# Patient Record
Sex: FEMALE | Race: WHITE
[De-identification: names, ages, dates, MRNs, and addresses within clinical notes are randomized per-mention and may not be internally consistent; named-entity substitution may affect disease eponyms.]

---

## 2018-05-31 ENCOUNTER — HOSPITAL ENCOUNTER (EMERGENCY)
Dept: HOSPITAL 80 - FED | Age: 23
Discharge: HOME | End: 2018-05-31
Payer: COMMERCIAL

## 2018-05-31 VITALS — DIASTOLIC BLOOD PRESSURE: 69 MMHG | SYSTOLIC BLOOD PRESSURE: 118 MMHG

## 2018-05-31 DIAGNOSIS — M77.8: Primary | ICD-10-CM

## 2018-05-31 NOTE — EDPHY
General





- History


Smoking Status: Never smoked


Time Seen by Provider: 05/31/18 21:35


Narrative: 





CHIEF COMPLAINT: 


Right wrist pain





HISTORY OF PRESENT ILLNESS: 


Patient complains of right wrist and forearm pain.  This started approximately 

10 days ago.  This was after heavy adding of CHI packing.  The pain is located 

on the lateral aspect of the right forearm, mid to distally.  It was minimal 

over the 1st few days, over the past 2 days it has become moderate to severe.  

No numbness or tingling.  No weakness.  No blunt trauma or injury.  She has a 

secondary complaint of a set of abrasions to the left lateral ankle.  She does 

not know when her last tetanus was and would like to be updated.  No other 

associated complaints or modifying factors.





DOMINANT EXTREMITY:


Right-hand dominant





ESTABLISHED ORTHOPEDIST:


None yet.  She has called and scheduled appointment on Monday with Dr. Pelon Jc





REVIEW OF SYSTEMS:


Ten systems reviewed and are negative unless otherwise noted in the HPI








PAST MEDICAL HISTORY: 


Uncomplicated





PAST SURGICAL HISTORY:


No surgical history





SOCIAL HISTORY:


 [employer].  Originally from [place]





FAMILY HISTORY:


Noncontributory





EXAMINATION


General Appearance:  Alert, no distress


Cardiovascular:  Symmetric radial pulses 2+.  Brisk cap refill on the fingers 

of the right hand


Neurological:  A&O, sensory symmetric, interossei strength symmetric.   

strength symmetric


Skin:  Warm and dry, no rash.  No petechiae purpura.  No puncture, laceration 

or ecchymosis.  There is superficial abrasion to the left lateral ankle below 

the malleolus.  No cellulitis, abscess or crepitus


Extremities:  Moderate tenderness of the right lateral forearm from mid to 

distal portion.  No snuffbox tenderness.  No pain of the phalanges or 

metacarpals.  Pain is worse with ulnar deviation of the wrist.  Equivocal 

Finkelstein test.  No palpable masses the forearm.  Range of motion of the 

elbow was unremarkable.


Psychiatric:  Mood and affect normal








DIFFERENTIAL DIAGNOSES:


Including but not limited to sprain, strain, tendinitis, tenosynovitis








MDM:


9:40 p.m.


Acute right wrist tendinitis versus declare veins tenosynovitis.  There is no 

bony tenderness.  No crepitus.  She is fully neuro intact.  I do not feel that 

she warrants an x-ray, nor does she want 1 at this time.  I will place her in a 

thumb spica splint to protect the wrist and provide some support for her.  She 

is weight-bearing as tolerated.  I have also ordered a Tdap booster for her 

abrasion as she does not know when her last tetanus was.  We discussed ice, 

elevation and anti-inflammatories.  Discharged home stable condition with 

orthopedic follow-up that she has secured on Monday.  ED precautions discussed








SUPERVISION:


This patient was independently evaluated without direct involvement of or 

examination by the attending physician. 





ED Precautions: 


Worsening pain. Erythema, edema, cyanosis, pallor, paresthesia or anesthesia.


 (Geovany Gomez)





The patient was evaluated and managed by the physician assistant.  I have 

reviewed this chart and I agree with the findings and plan of care as documented

, as indicated by my signature.  I am the secondary supervising physician. (

Jossie Bush)





- Objective


Vital Signs: 





 Initial Vital Signs











Temperature (C)  36.8 C   05/31/18 20:58


 


Heart Rate  64   05/31/18 20:58


 


Respiratory Rate  16   05/31/18 20:58


 


Blood Pressure  118/69   05/31/18 20:58


 


O2 Sat (%)  98   05/31/18 20:58








 











O2 Delivery Mode               Room Air














Allergies/Adverse Reactions: 


 





No Known Allergies Allergy (Unverified 05/31/18 20:57)


 








Home Medications: 














 Medication  Instructions  Recorded


 


NK [No Known Home Meds]  05/31/18











Medications Given: 





 








Discontinued Medications





Ibuprofen (Motrin)  600 mg PO EDNOW ONE


   Stop: 05/31/18 21:41


   Last Admin: 05/31/18 21:45 Dose:  600 mg








Departure





- Departure


Disposition: Home, Routine, Self-Care


Clinical Impression: 


 Right wrist tendinitis





Condition: Good


Instructions:  De Quervain Disease (ED), Tendinitis (ED)


Additional Instructions: 


1. Ice and elevate often


2. Ibuprofen 600 mg every 6-8 hours as needed for pain


3. Keep the appointment that she made on Monday with Dr. Jc


Referrals: 


NONE *PRIMARY CARE P,. [Primary Care Provider] - As per Instructions


Pelon Jc MD [Medical Doctor] - As per Instructions

## 2018-12-10 ENCOUNTER — HOSPITAL ENCOUNTER (EMERGENCY)
Dept: HOSPITAL 80 - FED | Age: 23
Discharge: HOME | End: 2018-12-10
Payer: COMMERCIAL

## 2018-12-10 VITALS — SYSTOLIC BLOOD PRESSURE: 104 MMHG | DIASTOLIC BLOOD PRESSURE: 80 MMHG

## 2018-12-10 DIAGNOSIS — Z97.5: ICD-10-CM

## 2018-12-10 DIAGNOSIS — R10.13: Primary | ICD-10-CM

## 2018-12-10 LAB — PLATELET # BLD: 285 10^3/UL (ref 150–400)

## 2018-12-10 NOTE — EDPHY
H & P


Stated Complaint: 1 week upper abd pain and dizzyness sent to r/o ulcer


Time Seen by Provider: 12/10/18 14:33


HPI/ROS: 





CHIEF COMPLAINT:  Intermittent abdominal pain





HISTORY OF PRESENT ILLNESS:  The patient presents to the ED with a one-week 

history of intermittent abdominal pain.  The patient has been taking NSAIDs at 

night.  She did have some dark stools earlier in the week but had taken Pepto-

Bismol.  The patient continues to complain of some ongoing epigastric 

discomfort.  She denies any lower abdominal pain.  She denies any prior history 

of gastrointestinal illness or disease.  The patient reports that her pain is 

mild in nature.  It is worsened with palpation and eating.





REVIEW OF SYSTEMS:


A comprehensive 10 point review of systems is otherwise negative aside from 

elements mentioned in the history of present illness.


Source: Patient


Exam Limitations: No limitations





- Personal History


LMP (Females 10-55): IUD In Place


Current Tetanus Diphtheria and Acellular Pertussis (TDAP): Yes





- Medical/Surgical History


Hx Asthma: No


Hx Chronic Respiratory Disease: No


Hx Diabetes: No


Hx Cardiac Disease: No


Hx Renal Disease: No


Hx Cirrhosis: No


Hx Alcoholism: No


Hx HIV/AIDS: No


Hx Splenectomy or Spleen Trauma: No


Other PMH: denies





- Social History


Smoking Status: Never smoked





- Physical Exam


Exam: 





General Appearance:  Alert, no distress


Eyes:  Pupils equal and round no pallor or injection


ENT, Mouth:  Mucous membranes moist


Respiratory:  There are no retractions, lungs are clear to auscultation


Cardiovascular:  Regular rate and rhythm


Gastrointestinal:  Minimal epigastric tenderness to palpation, no peritoneal 

signs, normal bowel sounds


Neurological:  5/5 strength all 4 extremities


Skin:  Warm and dry, no rashes


Musculoskeletal:  Neck is supple nontender


Extremities:  symmetrical, full range of motion


Psychiatric:  Patient is oriented X 3, there is no agitation


Constitutional: 


 Initial Vital Signs











Temperature (C)  36.9 C   12/10/18 13:39


 


Heart Rate  92   12/10/18 13:39


 


Respiratory Rate  16   12/10/18 13:39


 


Blood Pressure  112/81 H  12/10/18 13:39


 


O2 Sat (%)  97   12/10/18 13:39








 











O2 Delivery Mode               Room Air














Allergies/Adverse Reactions: 


 





No Known Allergies Allergy (Unverified 05/31/18 20:57)


 








Home Medications: 














 Medication  Instructions  Recorded


 


Pantoprazole Sodium [Protonix 40mg 40 mg PO BID #60 tab 12/10/18





(*)]  














Medical Decision Making





- Diagnostics


EKG Interpretation: 





EKG:  Complete interpretation has been separately recorded in the [email or web address] 

archive.  Summary impression:  Sinus rhythm, rate 71


ED Course/Re-evaluation: 





The patient presents the ED for evaluation of episodic epigastric pain over the 

past week.  The patient has been using NSAIDs at night.  She did have dark 

stool earlier in the week.  The patient denies any hematemesis.  She denies 

prior history of peptic ulcer disease.





The patient was noted to be hemodynamically stable.





Workup in the emergency department includes a normal CBC, serum chemistries, 

liver function test and lipase.  The patient's pregnancy test is negative.





Patient has no evidence of any anemia.





The patient certainly could be experiencing some NSAID induced gastritis.





The patient will be started on a proton pump inhibitor.  She is advised to 

return to the emergency department for bright red blood per rectum, increasing 

melena, lightheadedness or other concerns.





The patient is advised to follow up with her primary care provider for a 

recheck within the week.








Differential Diagnosis: 





Differential diagnosis considered includes peptic ulcer disease, gastritis, 

hepatitis, pancreatitis, ruptured ovarian cyst





- Data Points


Laboratory Results: 


 Laboratory Results





 12/10/18 14:45 





 12/10/18 14:45 





 











  12/10/18 12/10/18 12/10/18





  14:45 14:45 14:45


 


WBC      9.10 10^3/uL 10^3/uL





     (3.80-9.50) 


 


RBC      4.84 10^6/uL 10^6/uL





     (4.18-5.33) 


 


Hgb      14.6 g/dL g/dL





     (12.6-16.3) 


 


Hct      43.3 % %





     (38.0-47.0) 


 


MCV      89.5 fL fL





     (81.5-99.8) 


 


MCH      30.2 pg pg





     (27.9-34.1) 


 


MCHC      33.7 g/dL g/dL





     (32.4-36.7) 


 


RDW      12.5 % %





     (11.5-15.2) 


 


Plt Count      285 10^3/uL 10^3/uL





     (150-400) 


 


MPV      10.6 fL fL





     (8.7-11.7) 


 


Neut % (Auto)      74.4 % H %





     (39.3-74.2) 


 


Lymph % (Auto)      19.9 % %





     (15.0-45.0) 


 


Mono % (Auto)      4.3 % L %





     (4.5-13.0) 


 


Eos % (Auto)      0.7 % %





     (0.6-7.6) 


 


Baso % (Auto)      0.5 % %





     (0.3-1.7) 


 


Nucleat RBC Rel Count      0.0 % %





     (0.0-0.2) 


 


Absolute Neuts (auto)      6.77 10^3/uL H 10^3/uL





     (1.70-6.50) 


 


Absolute Lymphs (auto)      1.81 10^3/uL 10^3/uL





     (1.00-3.00) 


 


Absolute Monos (auto)      0.39 10^3/uL 10^3/uL





     (0.30-0.80) 


 


Absolute Eos (auto)      0.06 10^3/uL 10^3/uL





     (0.03-0.40) 


 


Absolute Basos (auto)      0.05 10^3/uL 10^3/uL





     (0.02-0.10) 


 


Absolute Nucleated RBC      0.00 10^3/uL 10^3/uL





     (0-0.01) 


 


Immature Gran %      0.2 % %





     (0.0-1.1) 


 


Immature Gran #      0.02 10^3/uL 10^3/uL





     (0.00-0.10) 


 


Sodium    142 mEq/L mEq/L  





    (135-145)  


 


Potassium    4.1 mEq/L mEq/L  





    (3.5-5.2)  


 


Chloride    107 mEq/L mEq/L  





    ()  


 


Carbon Dioxide    25 mEq/l mEq/l  





    (22-31)  


 


Anion Gap    10 mEq/L mEq/L  





    (6-14)  


 


BUN    16 mg/dL mg/dL  





    (7-23)  


 


Creatinine    1.1 mg/dL H mg/dL  





    (0.6-1.0)  


 


Estimated GFR    > 60   





    


 


Glucose    96 mg/dL mg/dL  





    ()  


 


Calcium    9.9 mg/dL mg/dL  





    (8.5-10.4)  


 


Total Bilirubin      





    


 


Conjugated Bilirubin      





    


 


Unconjugated Bilirubin      





    


 


AST      





    


 


ALT      





    


 


Alkaline Phosphatase      





    


 


Total Protein      





    


 


Albumin      





    


 


Lipase      





    


 


Beta HCG, Qual  NEGATIVE     





    














  12/10/18





  14:25


 


WBC  





  


 


RBC  





  


 


Hgb  





  


 


Hct  





  


 


MCV  





  


 


MCH  





  


 


MCHC  





  


 


RDW  





  


 


Plt Count  





  


 


MPV  





  


 


Neut % (Auto)  





  


 


Lymph % (Auto)  





  


 


Mono % (Auto)  





  


 


Eos % (Auto)  





  


 


Baso % (Auto)  





  


 


Nucleat RBC Rel Count  





  


 


Absolute Neuts (auto)  





  


 


Absolute Lymphs (auto)  





  


 


Absolute Monos (auto)  





  


 


Absolute Eos (auto)  





  


 


Absolute Basos (auto)  





  


 


Absolute Nucleated RBC  





  


 


Immature Gran %  





  


 


Immature Gran #  





  


 


Sodium  





  


 


Potassium  





  


 


Chloride  





  


 


Carbon Dioxide  





  


 


Anion Gap  





  


 


BUN  





  


 


Creatinine  





  


 


Estimated GFR  





  


 


Glucose  





  


 


Calcium  





  


 


Total Bilirubin  0.4 mg/dL mg/dL





   (0.1-1.4) 


 


Conjugated Bilirubin  0.4 mg/dL mg/dL





   (0.0-0.5) 


 


Unconjugated Bilirubin  0.0 mg/dL mg/dL





   (0.0-1.1) 


 


AST  30 IU/L IU/L





   (14-46) 


 


ALT  17 IU/L IU/L





   (9-52) 


 


Alkaline Phosphatase  75 IU/L IU/L





   () 


 


Total Protein  7.1 g/dL g/dL





   (6.3-8.2) 


 


Albumin  4.5 g/dL g/dL





   (3.5-5.0) 


 


Lipase  141 IU/L IU/L





   () 


 


Beta HCG, Qual  





  











Medications Given: 


 








Discontinued Medications





Sodium Chloride (Ns)  1,000 mls @ 0 mls/hr IV EDNOW ONE; Wide Open


   PRN Reason: Protocol


   Stop: 12/10/18 14:34


   Last Admin: 12/10/18 14:53 Dose:  1,000 mls








Departure





- Departure


Disposition: Home, Routine, Self-Care


Clinical Impression: 


Abdominal pain


Qualifiers:


 Abdominal location: epigastric Qualified Code(s): R10.13 - Epigastric pain





Condition: Good


Instructions:  Acute Abdominal Pain (ED)


Additional Instructions: 


1.  The blood testing done in the emergency department today demonstrates no 

evidence of anemia or active bleeding.


2. Return to the emergency department for increasing pain, black or bloody 

stools, fever or other concerns.


3. Please begin Protonix as prescribed.


4.  Follow up with your primary care provider within the week.


5. I do recommend abstaining from ibuprofen, Motrin and Aleve as this may be 

contributing to your intestinal discomfort














Referrals: 


Sridevi Smith NP [Primary Care Provider] - As per Instructions

## 2018-12-10 NOTE — CPEKG
Test Reason : OPEN

Blood Pressure : ***/*** mmHG

Vent. Rate : 071 BPM     Atrial Rate : 072 BPM

   P-R Int : 151 ms          QRS Dur : 087 ms

    QT Int : 396 ms       P-R-T Axes : 056 067 016 degrees

   QTc Int : 431 ms

 

Sinus rhythm

 

Confirmed by Adam Wright ([id number]) on 12/10/2018 3:04:09 PM

 

Referred By:             Confirmed By:Adam Wright

## 2019-01-26 ENCOUNTER — HOSPITAL ENCOUNTER (EMERGENCY)
Dept: HOSPITAL 80 - FED | Age: 24
Discharge: HOME | End: 2019-01-26
Payer: COMMERCIAL

## 2019-01-26 VITALS — SYSTOLIC BLOOD PRESSURE: 99 MMHG | DIASTOLIC BLOOD PRESSURE: 61 MMHG

## 2019-01-26 DIAGNOSIS — Z97.5: ICD-10-CM

## 2019-01-26 DIAGNOSIS — R10.31: Primary | ICD-10-CM

## 2019-01-26 DIAGNOSIS — E86.9: ICD-10-CM

## 2019-01-26 DIAGNOSIS — N83.201: ICD-10-CM

## 2019-01-26 LAB — PLATELET # BLD: 258 10^3/UL (ref 150–400)

## 2019-01-26 NOTE — EDPHY
HPI/HX/ROS/PE/MDM


Narrative: 





CHIEF COMPLAINT: Abdominal pain





HISTORY OF PRESENT ILLNESS: 


The patient is a 24 y/o female with a GERD and gastritis complaining of lower 

abdominal pain onset 1 hour ago.  Patient has been seen previously with 

symptoms which were felt to be related to GERD.  She did not have endoscopy.  

She improved on a course of PPI eyes. She has been off of all PPI's since early 

January. However, on Thursday, 2 days ago, she developed heartburn which was 

relieved with Tums.. Today she developed suprapubic and lower abdominal 

discomfort which concerned her. 


No urinary complaints, no fever, no vomiting, no shortness of breath, no 

diarrhea, no headache, no vaginal bleeding.  Patient does report that her last 

period was quite light.  She has a copper IUD in place.





REVIEW OF SYSTEMS:


Aside from elements discussed in the HPI, a comprehensive 10-point review of 

systems was reviewed and is negative.





PAST MEDICAL HISTORY:  GERD, depression, anxiety





SOCIAL HISTORY: Lives in [place], single, student at [employer]





VITAL SIGNS: Reviewed by me


GENERAL: Well-developed, well-nourished, complaining of lower abdominal 

discomfort and right sided discomfort


HEENT: Atraumatic. Eyes: No icterus, no injection. Mouth: moist mucous 

membranes.  No erythema or lesions. Neck: supple with no adenopathy.


LUNGS: Clear to auscultation bilaterally, no wheezes, rhonchi or rales.


CARDIAC: Regular rate and rhythm, no rubs, murmurs or gallops.


ABDOMEN: Suprapubic, right adnexal, right lower quadrant, and right mid 

quadrant tenderness to palpation.  No guarding or rebound.  Abdomen is soft.  

No distension.


BACK:  No CVA tenderness.


EXTREMITIES: No trauma. No edema.  Range of motion is normal throughout.


NEURO: Alert and oriented,  grossly nonfocal.  


SKIN: Warm and dry, no rash.


PSYCHIATRIC: Normal mentation, no agitation.





Portions of this note were transcribed by a medical scribe.  I personally 

performed a history, physical exam, medical decision making, and confirmed 

accuracy of information the transcribed note.





ED Course: 





The patient is a 24 y/o female with a history of presumed GERD presenting with 

right adnexal, right lower quadrant, right upper quadrant abdominal pain onset 

1 hour ago. On exam she has suprapubic, adnexal, and RLQ tenderness to 

palpation. Labs and abdominal US ordered; GI cocktail and 1L IV NS administered.





Labs are none unremarkable.  Patient is not pregnant.  


2000:  Ultrasounds reports me as showing follicular cysts, with a right ovarian 

cyst of 2 cm.  Right upper quadrant ultrasound was negative.  Appendix was not 

visualized.





2025: Reassessed patient and discussed imaging findings.  Patient has many 

questions regarding the implication of a low cortisol level which she has 

recently been diagnosed with, and whether this has anything to do with her 

abdominal pain.  I think her pain is most likely related to follicular cyst as 

she reports an abnormally light .  Recently.  She does have some mild right 

lower quadrant right mid quadrant right upper quadrant tenderness.  She has had 

no fever.  No vomiting.  No significant tenderness.  I do not believe that 

patient needs further evaluation for appendicitis at this time.  





15mg IV Toradol administered prior to discharge. I have advised her to take 

Omeprazole and Bentyl for her symptoms. I have also referred her to a 

gastroenterologist. Return precautions provided; patient is comfortable with 

this plan.


MDM: 





The differential diagnosis for the patient's abdominal pain was considered 

including but not limited to ovarian cyst, appendicitis, gastritis, irritable 

bowel, pelvic inflammatory disease, ovarian torsion, urinary tract infection, 

pregnancy related complications, and appendicitis.





- Data Points


Imaging Results: 


 Imaging Impressions





Abdomen Ultrasound  01/26/19 18:56


Impression:  Normal right upper quadrant ultrasound.








Pelvic/Renal Ultrasound  01/26/19 18:57


Impression: 


1. IUD in good position.


2. Follicular cyst on the right.


3. No significant abnormality in the pelvis.


 


Findings discussed with Kizzy Morales MD  at  19:58 hour, 1/26/2019.











Imaging: Discussed imaging studies w/ On call Radiologist


Laboratory Results: 


 Laboratory Results





 01/26/19 18:15 





 01/26/19 18:15 





 











  01/26/19 01/26/19 01/26/19





  19:10 18:15 18:15


 


WBC      





    


 


RBC      





    


 


Hgb      





    


 


Hct      





    


 


MCV      





    


 


MCH      





    


 


MCHC      





    


 


RDW      





    


 


Plt Count      





    


 


MPV      





    


 


Neut % (Auto)      





    


 


Lymph % (Auto)      





    


 


Mono % (Auto)      





    


 


Eos % (Auto)      





    


 


Baso % (Auto)      





    


 


Nucleat RBC Rel Count      





    


 


Absolute Neuts (auto)      





    


 


Absolute Lymphs (auto)      





    


 


Absolute Monos (auto)      





    


 


Absolute Eos (auto)      





    


 


Absolute Basos (auto)      





    


 


Absolute Nucleated RBC      





    


 


Immature Gran %      





    


 


Immature Gran #      





    


 


Sodium      





    


 


Potassium      





    


 


Chloride      





    


 


Carbon Dioxide      





    


 


Anion Gap      





    


 


BUN      





    


 


Creatinine      





    


 


Estimated GFR      





    


 


Glucose      





    


 


Calcium      





    


 


Total Bilirubin      





    


 


AST      





    


 


ALT      





    


 


Alkaline Phosphatase      





    


 


Total Protein      





    


 


Albumin      





    


 


Lipase      192 IU/L IU/L





     () 


 


Beta HCG, Qual    NEGATIVE   





    


 


Urine Color  YELLOW     





    


 


Urine Appearance  CLEAR     





    


 


Urine pH  6.0     





   (5.0-7.5)   


 


Ur Specific Gravity  1.016     





   (1.002-1.030)   


 


Urine Protein  NEGATIVE     





   (NEGATIVE)   


 


Urine Ketones  2+  H     





   (NEGATIVE)   


 


Urine Blood  NEGATIVE     





   (NEGATIVE)   


 


Urine Nitrate  NEGATIVE     





   (NEGATIVE)   


 


Urine Bilirubin  NEGATIVE     





   (NEGATIVE)   


 


Urine Urobilinogen  NEGATIVE EU EU    





   (0.2-1.0)   


 


Ur Leukocyte Esterase  NEGATIVE     





   (NEGATIVE)   


 


Urine RBC  1-3 /hpf /hpf    





   (0-3)   


 


Urine WBC  1-3 /hpf /hpf    





   (0-3)   


 


Ur Epithelial Cells  TRACE /lpf /lpf    





   (NONE-1+)   


 


Urine Bacteria  TRACE /hpf H /hpf    





   (NONE SEEN)   


 


Urine Mucus  TRACE /lpf /lpf    





   (NONE-1+)   


 


Urine Glucose  NEGATIVE     





   (NEGATIVE)   














  01/26/19 01/26/19





  18:15 18:15


 


WBC    12.35 10^3/uL H 10^3/uL





    (3.80-9.50) 


 


RBC    4.76 10^6/uL 10^6/uL





    (4.18-5.33) 


 


Hgb    14.5 g/dL g/dL





    (12.6-16.3) 


 


Hct    43.1 % %





    (38.0-47.0) 


 


MCV    90.5 fL fL





    (81.5-99.8) 


 


MCH    30.5 pg pg





    (27.9-34.1) 


 


MCHC    33.6 g/dL g/dL





    (32.4-36.7) 


 


RDW    12.1 % %





    (11.5-15.2) 


 


Plt Count    258 10^3/uL 10^3/uL





    (150-400) 


 


MPV    10.7 fL fL





    (8.7-11.7) 


 


Neut % (Auto)    68.3 % %





    (39.3-74.2) 


 


Lymph % (Auto)    23.8 % %





    (15.0-45.0) 


 


Mono % (Auto)    5.0 % %





    (4.5-13.0) 


 


Eos % (Auto)    2.2 % %





    (0.6-7.6) 


 


Baso % (Auto)    0.4 % %





    (0.3-1.7) 


 


Nucleat RBC Rel Count    0.0 % %





    (0.0-0.2) 


 


Absolute Neuts (auto)    8.43 10^3/uL H 10^3/uL





    (1.70-6.50) 


 


Absolute Lymphs (auto)    2.94 10^3/uL 10^3/uL





    (1.00-3.00) 


 


Absolute Monos (auto)    0.62 10^3/uL 10^3/uL





    (0.30-0.80) 


 


Absolute Eos (auto)    0.27 10^3/uL 10^3/uL





    (0.03-0.40) 


 


Absolute Basos (auto)    0.05 10^3/uL 10^3/uL





    (0.02-0.10) 


 


Absolute Nucleated RBC    0.00 10^3/uL 10^3/uL





    (0-0.01) 


 


Immature Gran %    0.3 % %





    (0.0-1.1) 


 


Immature Gran #    0.04 10^3/uL 10^3/uL





    (0.00-0.10) 


 


Sodium  137 mEq/L mEq/L  





   (135-145)  


 


Potassium  4.1 mEq/L mEq/L  





   (3.5-5.2)  


 


Chloride  106 mEq/L mEq/L  





   ()  


 


Carbon Dioxide  21 mEq/l L mEq/l  





   (22-31)  


 


Anion Gap  10 mEq/L mEq/L  





   (6-14)  


 


BUN  20 mg/dL mg/dL  





   (7-23)  


 


Creatinine  0.8 mg/dL mg/dL  





   (0.6-1.0)  


 


Estimated GFR  > 60   





   


 


Glucose  86 mg/dL mg/dL  





   ()  


 


Calcium  9.3 mg/dL mg/dL  





   (8.5-10.4)  


 


Total Bilirubin  0.5 mg/dL mg/dL  





   (0.1-1.4)  


 


AST  18 IU/L IU/L  





   (14-46)  


 


ALT  20 IU/L IU/L  





   (9-52)  


 


Alkaline Phosphatase  69 IU/L IU/L  





   ()  


 


Total Protein  7.1 g/dL g/dL  





   (6.3-8.2)  


 


Albumin  4.4 g/dL g/dL  





   (3.5-5.0)  


 


Lipase    





   


 


Beta HCG, Qual    





   


 


Urine Color    





   


 


Urine Appearance    





   


 


Urine pH    





   


 


Ur Specific Gravity    





   


 


Urine Protein    





   


 


Urine Ketones    





   


 


Urine Blood    





   


 


Urine Nitrate    





   


 


Urine Bilirubin    





   


 


Urine Urobilinogen    





   


 


Ur Leukocyte Esterase    





   


 


Urine RBC    





   


 


Urine WBC    





   


 


Ur Epithelial Cells    





   


 


Urine Bacteria    





   


 


Urine Mucus    





   


 


Urine Glucose    





   











Medications Given: 


 








Discontinued Medications





Al Hydroxide/Mg Hydroxide (Maalox Susp)  30 ml PO ONCE ONE


   Stop: 01/26/19 18:57


   Last Admin: 01/26/19 19:34 Dose:  30 ml


Hyoscyamine Sulfate (Levsin, Hyomax-Sl)  0.25 mg PO ONCE ONE


   Stop: 01/26/19 18:57


   Last Admin: 01/26/19 19:35 Dose:  0.25 mg


Sodium Chloride (Ns)  1,000 mls @ 0 mls/hr IV ONCE ONE; Wide Open


   PRN Reason: Protocol


   Stop: 01/26/19 19:23


   Last Admin: 01/26/19 19:34 Dose:  1,000 mls


Ketorolac Tromethamine (Toradol)  15 mg IVP EDNOW ONE


   Stop: 01/26/19 20:49


   Last Admin: 01/26/19 20:55 Dose:  15 mg


Lidocaine (Lidocaine 2% Viscous)  15 ml PO ONCE ONE


   Stop: 01/26/19 18:57


   Last Admin: 01/26/19 19:34 Dose:  15 ml


Oxycodone/Acetaminophen (Percocet 5/325mg Prepack#4)  1 btl TAKEHOME EDNOW ONE


   Stop: 01/26/19 21:00


   Last Admin: 01/26/19 21:06 Dose:  1 btl








General


Time Seen by Provider: 01/26/19 18:16


Initial Vital Signs: 


 Initial Vital Signs











Temperature (C)  36.6 C   01/26/19 17:51


 


Heart Rate  88   01/26/19 17:51


 


Respiratory Rate  18   01/26/19 17:51


 


Blood Pressure  107/76   01/26/19 17:51


 


O2 Sat (%)  98   01/26/19 17:51








 











O2 Delivery Mode               Room Air














Allergies/Adverse Reactions: 


 





No Known Allergies Allergy (Verified 01/26/19 17:51)


 








Home Medications: 














 Medication  Instructions  Recorded


 


Dicyclomine [Bentyl 20 MG (*)] 20 mg PO QID #30 tab 01/26/19


 


Seroquel  01/26/19














Departure





- Departure


Disposition: Home, Routine, Self-Care


Clinical Impression: 


Abdominal pain


Qualifiers:


 Abdominal location: generalized Qualified Code(s): R10.84 - Generalized 

abdominal pain





Ovarian cyst


Qualifiers:


 Laterality: unspecified laterality Qualified Code(s): N83.209 - Unspecified 

ovarian cyst, unspecified side





Instructions:  Oxycodone/Acetaminophen (By mouth), Ovarian Cyst (ED), Abdominal 

Pain (ED)


Additional Instructions: 


Take Omeprazole and Bentyl as prescribed.


Omeprazole as available over-the-counter.


Bentyl 20 mg up to 4 times a day for crampy abdominal discomfort.





You may take Tylenol as needed for lower abdominal discomfort related to an 

ovarian cyst.  You also been given a prepack of hydrocodone tablets.  You may 

use this as needed for severe pain.  However, please note that it may cause 

constipation.





Follow up with a gastroenterologist; you have been referred to Dr. Newton.





Return to the Emergency Department for fever, chest pain, shortness of breath, 

increasing pain or other worsening of condition.





Referrals: 


SWAPNIL BAPTISTE [Other] - As per Instructions


Rico Newton MD [Medical Doctor] - As per Instructions


Prescriptions: 


Dicyclomine [Bentyl 20 MG (*)] 20 mg PO QID #30 tab


Report Scribed for: Kizzy Morales


Report Scribed by: Letitia Guido


Date of Report: 01/26/19


Time of Report: 18:22